# Patient Record
Sex: FEMALE | Race: WHITE | NOT HISPANIC OR LATINO | Employment: UNEMPLOYED | URBAN - METROPOLITAN AREA
[De-identification: names, ages, dates, MRNs, and addresses within clinical notes are randomized per-mention and may not be internally consistent; named-entity substitution may affect disease eponyms.]

---

## 2017-08-30 ENCOUNTER — OFFICE VISIT (OUTPATIENT)
Dept: PSYCHIATRY | Facility: CLINIC | Age: 42
End: 2017-08-30
Payer: COMMERCIAL

## 2017-08-30 VITALS
BODY MASS INDEX: 32.76 KG/M2 | SYSTOLIC BLOOD PRESSURE: 135 MMHG | HEART RATE: 76 BPM | WEIGHT: 178 LBS | DIASTOLIC BLOOD PRESSURE: 81 MMHG | HEIGHT: 62 IN

## 2017-08-30 DIAGNOSIS — F45.22 BODY DYSMORPHIC DISORDER WITH GOOD OR FAIR INSIGHT: ICD-10-CM

## 2017-08-30 DIAGNOSIS — F32.1 MODERATE SINGLE CURRENT EPISODE OF MAJOR DEPRESSIVE DISORDER: Primary | ICD-10-CM

## 2017-08-30 DIAGNOSIS — M79.7 FIBROMYALGIA: ICD-10-CM

## 2017-08-30 PROBLEM — N30.10 INTERSTITIAL CYSTITIS: Status: ACTIVE | Noted: 2017-08-30

## 2017-08-30 PROCEDURE — 99999 PR PBB SHADOW E&M-NEW PATIENT-LVL III: CPT | Mod: PBBFAC,,, | Performed by: PSYCHIATRY & NEUROLOGY

## 2017-08-30 PROCEDURE — 90792 PSYCH DIAG EVAL W/MED SRVCS: CPT | Mod: S$GLB,,, | Performed by: PSYCHIATRY & NEUROLOGY

## 2017-08-30 RX ORDER — CLONAZEPAM 0.5 MG/1
0.5 TABLET ORAL DAILY PRN
Qty: 30 TABLET | Refills: 2 | Status: SHIPPED | OUTPATIENT
Start: 2017-08-30 | End: 2017-10-24 | Stop reason: SDUPTHER

## 2017-08-30 RX ORDER — FLUOXETINE 10 MG/1
10 TABLET ORAL DAILY
Qty: 30 TABLET | Refills: 5 | Status: SHIPPED | OUTPATIENT
Start: 2017-08-30 | End: 2017-10-02 | Stop reason: SDUPTHER

## 2017-08-30 RX ORDER — TRAMADOL HYDROCHLORIDE 50 MG/1
TABLET ORAL EVERY 12 HOURS PRN
COMMUNITY
Start: 2017-08-10

## 2017-08-30 RX ORDER — MOMETASONE FUROATE 50 UG/1
SPRAY, METERED NASAL
COMMUNITY
Start: 2017-07-20

## 2017-08-30 NOTE — PROGRESS NOTES
"Ambulatory Psychiatry Clinic Initial MD Evaluation    ENCOUNTER DATE: 2017  SITE: [facility], [facility]  REFFERAL SOURCE: Emelia Landaverde MD  LENGTH OF SESSION: 60 minutes    CHIEF COMPLAINT   Anxiety      HISTORY:  HISTORY OF PRESENTING ILLNESS   Noris Washburn is a 42 y.o. female, previously in treatment with Dr Perales for anxiety     Has had anxiety for a long time, with panic attacks that were so severe that she could not leave her house. Notes that sx got better for a while, but then worsened 2 years ago. Had hysterectomy at that time. Of note, still has ovaries but complains of urinary urgency due to her bladder being punctured during surgery. Was later diagnosed with interstitial cystitis. Has also been diagnosed with fibromyalgia and arthritis in her neck,  "hurts all the time." States that bladder issues, bladder pain, fibromylagia and arthritis pain all started 2 years ago. Reports this greatly limiting her life and preventing her from accomplishing things. States that anxiety was helped by xanax that she continued until recently, using leftover supply from earlier prescriptions. Brother was in a car accident and got paralyzed in . and her mother  in . Notes that she has less extended family support and interactions since. Further stressed out by her son who had a baby with his girlfriend 3 years ago when he was 15. They have all since been living with her.  has also been drinking alcohol heavily, he is never physically violent but is disagreeable when intoxicated.     Reports sleep problems, only got 3 hours last night after taking benadryl. Will take it only occaisonally because not comfortable taking any allergy medication for sleep despite encouragement by her rheumatology. Will typically sleep only 6 hours each night, sleeps from 6 or 6:30 in the morning until 2pm. Reports waking frequently due to bladder issues. Previously had been sleeping from " midnight until 10am. Acknowledges that she has some depression but worried about the diagnosis because whenever she has been given antidepressants in the past they made her feel worse and increased her anxiety. Her family psychiatrist gave her medicine to get to this apointment. Has had thoughts that life is not worth living but would never consider suicide, cites kids and family as protective factors.    Had been treated with baclofen for muscle aches and interstitia cystitis but it made her paranoid.  Has tried paxil, zoloft and wellbutrin. Wellbutrin made her feel suicidal. Too prozac every other day with Dr Perales, states its the only one that did not make her feel worse. Doctor prescribed cymbalta but she did not want to start it because she saw her  have bad withdrawal attempting to stop it.   Low doses of amitriptyline caused weight gain and aggression/irritability. Seroquel helped with insomnia but caused restlessness. Has never tried abilify or lamictal. Gabapentin increases her anxiety. Was prescribed pregabalin but was not willing to take it because scared of the side effects. Has never tried vistaril or atarax    Has klonopin 0.5 mg, #30 with several pills remaining filled 4 months ago.     Patient at end of appointment revealed main source of distress, patient reports longstanding belief that her breath is intolerably bad and offends other people.Startedwhen she had reflux and first noted bad tastein her breath. Has steadily worsened despite GERD treatment and fundopliction. Believes that any one near her will smell her breath and not want to be around her. As a result avoids people. Even worries that it will prevent her granddaughter from wanting to be with her, became tearful discussing this. Worry about her bad breath has lowered her self esteem. Is able to see vicious cylce of avoidance and worsened self esteem and mood resulting from this fear. Patient has insight that it is probably  irrational but can't help worrying about it. Belief has gotten worse in past year. Denies other obsessive fears about her health or other subjects.   ROS   Complete review of systems performed covering Constitutional, Eyes, ENT/Mouth, Cardiovascular, Respiratory, Gastrointestinal, Genitourinary, Musculoskeletal, Skin, Neurologic, Endocrine, and Allergy/Immune. Reports chronic pain in neck and diffusely and also bladders pasms, urgency. All other systems were negative.    Psych ROS covered elsewhere in note (HPI)      PAST PSYCHIATRIC HISTORY  Denies    SUBSTANCE ABUSE HISTORY   Denies illegal substance use, tobacco use or alcohol. Denies prescription drug use.    PAST MEDICAL HISTORY   GERD, s/p fundoplication, partial thyroidectomy, arthritis, hysterectomy 2/2 pain, interstitial cystitis and fibromyalgia.    NEUROLOGIC HISTORY   Denies    MEDICATIONS   Scheduled and PRN Medications     Current Outpatient Prescriptions:     clonazepam (KLONOPIN) 1 MG tablet, Take 1 mg by mouth 3 (three) times daily as needed., Disp: , Rfl:     HYDROCODONE BIT/ACETAMINOPHEN (HYDROCODONE-ACETAMINOPHEN 7.5-325 MG/15 ML) 7.5 mg-325 mg /15 mL (15 mL) Soln, Take 15 mLs by mouth every 4 (four) hours as needed (pain)., Disp: 500 mL, Rfl: 0    lansoprazole (PREVACID SOLUTAB) 30 MG disintegrating tablet, Take 1 tablet (30 mg total) by mouth once daily., Disp: 30 tablet, Rfl: 11    ondansetron (ZOFRAN-ODT) 8 MG TbDL, Take 1 tablet (8 mg total) by mouth every 12 (twelve) hours as needed., Disp: 14 tablet, Rfl: 0    ondansetron (ZOFRAN-ODT) 8 MG TbDL, Take 1 tablet (8 mg total) by mouth every 12 (twelve) hours as needed., Disp: 14 tablet, Rfl: 0    polyethylene glycol (GLYCOLAX) 17 gram PwPk, Take 17 g by mouth as directed., Disp: 14 packet, Rfl: 0    polyethylene glycol (GLYCOLAX) 17 gram PwPk, Take 17 g by mouth as directed., Disp: 14 each, Rfl: 0        ALLERGIES   Review of patient's allergies indicates:   Allergen Reactions    Advil  "[ibuprofen] Hives         FAMILY PSYCHIATRIC HISTORY   Daughter with anxiety.     SOCIAL HISTORY  , stay at home mom, lives with , 2 sons, son's girlfriend and granddaughter.    EXAM  VITALS   Vitals:    08/30/17 1213   BP: 135/81   Pulse: 76   Weight: 80.7 kg (178 lb)   Height: 5' 2" (1.575 m)       RELEVANT LABS/STUDIES:    PSYCHIATRIC EXAMINATION  Appearance: well groomed, appearing healthy and of stated age, normaldentition, no halitosis.    Behavior: cooperative, no psychomotor agitation or retardation.  Speech: normal rate, rhythm, prosody, volume and amount  Mood: depressed, anxious  Affect: congruent  Thought Process: linear, logical, goal directed  Thought Content: negative for suicidal ideation, homicidal ideation, delusions or hallucinations. Obsessive fear about halitosis.  Associations: intact  Memory: grossly intact  Level of Consciousness/Orientation: grossly intact  Fund of Knowledge: good  Attention: good  Language: fluent, able to name abstract and concrete objects.  Insight: fair  Judgment: somewhat limited in regards to fear of halitosis, patient allowing fear to prevent her fromsocializing and going out.     Psychomotor signs: no involuntary movements or tremor  Gait: normal    Medical Decision Making    IMPRESSION   43 yo F with chronic anxiety and longstanding fear about having halitosis, now presenting with depressive sx and obsessive fear about having bad breath. Presentation consistent with CARRIE and probably body dysmorphic disorder, complicated by acute depression in setting of several stressors. Patient also with hx of various obsessions and compulsions, possibly consistent with OCD, but none ever as severe as current fear of halitosis. Has had bad response to numerous past serotonergic agents, but no evidence of bipolar disorder.      DIAGNOSES  Body dysmorphic diosrder  Major Depressive Disorder,single episode moderate  Fibromyalgia    R/o OCD, CARRIE, Panic " disorder        PLAN  1. Start prozac 5 mg daily in the morning (1/2 of a 10 mg tablet). Once you are tolerating it, increase it to 10 mg daily. Patient unable to tolerate other ssris due to side effects.  2. You may continue klonopin 0.5 mg as needed daily for anxiety. Try not to take together with tramadol. Counseled about risk of overdose if used in conjunction.Pt uses sparingly, no concern for addiction.  3. Try melatonin 3 mg to help you sleep earlier. Recommend taking 2 hours before desired bedtime and gradually trying to set bedtime earlier by 2 hours every several days (eg take at 2 am tonight and then fall asleep at 4am. Then after 5 days take at 12 midnight and try to fall asleep at 2am etc.  4. If prozac is not working or you are unable to tolerate, send me a message or call.  5. REcommend therapy.  6. Set up appointment with primary care to check bloodwork, in particular have your thyroid hormone checked (TSH).  7. Return for follow up in 1-2 months.   8. Set up [facility] account and send me message if having problems.      ABILITY TO ADHERE TO TREATMENT PLAN  Fair

## 2017-08-30 NOTE — PATIENT INSTRUCTIONS
1. Start prozac 5 mg daily in the morning (1/2 of a 10 mg tablet). Once you are tolerating it, increase it to 10 mg daily.   2. You may continue klonopin 0.5 mg as needed daily for anxiety. Try not to take together with tramadol.  3. Try melatonin 3 mg to help you sleep earlier. Recommend taking 2 hours before desired bedtime and gradually trying to set bedtime earlier by 2 hours every several days (eg take at 2 am tonight and then fall asleep at 4am. Then after 5 days take at 12 midnight and try to fall asleep at 2am etc.  4. If prozac is not working or you are unable to tolerate, send me a message or call.  5. REcommend therapy.  6. Set up appointment with primary care to check bloodwork, in particular have your thyroid hormone checked (TSH).  7. Return for follow up in 1-2 months.   8. Set up [facility] account and send me message if having problems.

## 2017-09-03 PROBLEM — F32.1 MODERATE SINGLE CURRENT EPISODE OF MAJOR DEPRESSIVE DISORDER: Status: ACTIVE | Noted: 2017-09-03

## 2017-09-22 ENCOUNTER — PATIENT MESSAGE (OUTPATIENT)
Dept: PSYCHIATRY | Facility: CLINIC | Age: 42
End: 2017-09-22

## 2017-10-02 ENCOUNTER — PATIENT MESSAGE (OUTPATIENT)
Dept: PSYCHIATRY | Facility: CLINIC | Age: 42
End: 2017-10-02

## 2017-10-02 RX ORDER — FLUOXETINE 20 MG/1
20 TABLET ORAL DAILY
Qty: 30 TABLET | Refills: 5 | Status: SHIPPED | OUTPATIENT
Start: 2017-10-02 | End: 2017-10-24

## 2017-10-24 ENCOUNTER — OFFICE VISIT (OUTPATIENT)
Dept: PSYCHIATRY | Facility: CLINIC | Age: 42
End: 2017-10-24
Payer: COMMERCIAL

## 2017-10-24 ENCOUNTER — LAB VISIT (OUTPATIENT)
Dept: LAB | Facility: HOSPITAL | Age: 42
End: 2017-10-24
Attending: PSYCHIATRY & NEUROLOGY
Payer: COMMERCIAL

## 2017-10-24 VITALS
BODY MASS INDEX: 32.54 KG/M2 | DIASTOLIC BLOOD PRESSURE: 70 MMHG | WEIGHT: 176.81 LBS | HEIGHT: 62 IN | SYSTOLIC BLOOD PRESSURE: 152 MMHG | HEART RATE: 91 BPM

## 2017-10-24 DIAGNOSIS — F32.1 MODERATE SINGLE CURRENT EPISODE OF MAJOR DEPRESSIVE DISORDER: ICD-10-CM

## 2017-10-24 DIAGNOSIS — F45.22 BODY DYSMORPHIC DISORDER WITH GOOD OR FAIR INSIGHT: Primary | ICD-10-CM

## 2017-10-24 DIAGNOSIS — M79.7 FIBROMYALGIA: ICD-10-CM

## 2017-10-24 LAB
25(OH)D3+25(OH)D2 SERPL-MCNC: 27 NG/ML
ALBUMIN SERPL BCP-MCNC: 3.5 G/DL
ALP SERPL-CCNC: 73 U/L
ALT SERPL W/O P-5'-P-CCNC: 15 U/L
ANION GAP SERPL CALC-SCNC: 8 MMOL/L
AST SERPL-CCNC: 20 U/L
BASOPHILS # BLD AUTO: 0.08 K/UL
BASOPHILS NFR BLD: 1.2 %
BILIRUB SERPL-MCNC: 0.6 MG/DL
BUN SERPL-MCNC: 10 MG/DL
CALCIUM SERPL-MCNC: 8.9 MG/DL
CHLORIDE SERPL-SCNC: 104 MMOL/L
CHOLEST SERPL-MCNC: 189 MG/DL
CHOLEST/HDLC SERPL: 4.1 {RATIO}
CO2 SERPL-SCNC: 29 MMOL/L
CREAT SERPL-MCNC: 0.8 MG/DL
DIFFERENTIAL METHOD: NORMAL
EOSINOPHIL # BLD AUTO: 0.3 K/UL
EOSINOPHIL NFR BLD: 4.1 %
ERYTHROCYTE [DISTWIDTH] IN BLOOD BY AUTOMATED COUNT: 11.8 %
EST. GFR  (AFRICAN AMERICAN): >60 ML/MIN/1.73 M^2
EST. GFR  (NON AFRICAN AMERICAN): >60 ML/MIN/1.73 M^2
GLUCOSE SERPL-MCNC: 88 MG/DL
HCT VFR BLD AUTO: 39.9 %
HDLC SERPL-MCNC: 46 MG/DL
HDLC SERPL: 24.3 %
HGB BLD-MCNC: 13.3 G/DL
IMM GRANULOCYTES # BLD AUTO: 0.02 K/UL
IMM GRANULOCYTES NFR BLD AUTO: 0.3 %
LDLC SERPL CALC-MCNC: 111 MG/DL
LYMPHOCYTES # BLD AUTO: 1.5 K/UL
LYMPHOCYTES NFR BLD: 22.2 %
MCH RBC QN AUTO: 30.7 PG
MCHC RBC AUTO-ENTMCNC: 33.3 G/DL
MCV RBC AUTO: 92 FL
MONOCYTES # BLD AUTO: 0.5 K/UL
MONOCYTES NFR BLD: 8 %
NEUTROPHILS # BLD AUTO: 4.2 K/UL
NEUTROPHILS NFR BLD: 64.2 %
NONHDLC SERPL-MCNC: 143 MG/DL
NRBC BLD-RTO: 0 /100 WBC
PLATELET # BLD AUTO: 274 K/UL
PMV BLD AUTO: 10.2 FL
POTASSIUM SERPL-SCNC: 3.4 MMOL/L
PROT SERPL-MCNC: 6.9 G/DL
RBC # BLD AUTO: 4.33 M/UL
SODIUM SERPL-SCNC: 141 MMOL/L
TRIGL SERPL-MCNC: 160 MG/DL
TSH SERPL DL<=0.005 MIU/L-ACNC: 2.32 UIU/ML
WBC # BLD AUTO: 6.52 K/UL

## 2017-10-24 PROCEDURE — 85025 COMPLETE CBC W/AUTO DIFF WBC: CPT

## 2017-10-24 PROCEDURE — 80061 LIPID PANEL: CPT

## 2017-10-24 PROCEDURE — 80053 COMPREHEN METABOLIC PANEL: CPT

## 2017-10-24 PROCEDURE — 84443 ASSAY THYROID STIM HORMONE: CPT

## 2017-10-24 PROCEDURE — 99999 PR PBB SHADOW E&M-EST. PATIENT-LVL III: CPT | Mod: PBBFAC,,, | Performed by: PSYCHIATRY & NEUROLOGY

## 2017-10-24 PROCEDURE — 82306 VITAMIN D 25 HYDROXY: CPT

## 2017-10-24 PROCEDURE — 99214 OFFICE O/P EST MOD 30 MIN: CPT | Mod: S$GLB,,, | Performed by: PSYCHIATRY & NEUROLOGY

## 2017-10-24 PROCEDURE — 36415 COLL VENOUS BLD VENIPUNCTURE: CPT

## 2017-10-24 RX ORDER — FLUOXETINE 20 MG/1
40 TABLET ORAL DAILY
Qty: 60 TABLET | Refills: 5 | Status: SHIPPED | OUTPATIENT
Start: 2017-10-24 | End: 2018-10-24

## 2017-10-24 RX ORDER — CLONAZEPAM 0.5 MG/1
0.5 TABLET ORAL DAILY PRN
Qty: 30 TABLET | Refills: 2 | Status: SHIPPED | OUTPATIENT
Start: 2017-10-24 | End: 2018-05-18 | Stop reason: SDUPTHER

## 2017-10-24 NOTE — PATIENT INSTRUCTIONS
1. Increase prozac to 40 mg daily in 1-2 weeks.  2. Continue klonopin 0.5 mg as needed daily for anxiety  3. Get bloodwork, ask  in front to set up bloodwork.  4. Set up appointment in 3 months.

## 2017-10-24 NOTE — PROGRESS NOTES
Ambulatory Psychiatry Established Patient Follow-up Note      Chief Complaint  presents for followup of depression, anxiety, OCD sx    Time Spent  30 minutes    HISTORY  Interval History  Helped a little by the medication, feeling gradual improvement. Is having some gastrointestinal symptom but has had these for a logn time. With more diarrhea. Has IBS diagnosis. No other side effects. Has gradually increased over time, up to 30 mg as of 4 days ago. Has been getting out of her room more. Still fibromyalgia. Still feel worried about halitosis and it pushing people away.     ROS   Constitutional: no fatigue or appetite or weight change  Eyes: no problems with vision  ENT/Mouth: no problems with hearing, swallowing  Cardiovascular: no chest pain  Respiratory: no shortness of breath  Gastrointestinal: chronic diarrhea. No abdominal pain or nausea/vomiting  Genitourinary: no urinary difficulties  Musculoskeletal: fibromylalgia pain  Skin: no rashes  Neurologic: no numbness or weakness   Endocrine: no sweating or hot flashes.   All other systems were negative.    Psych ROS covered in hospitals  Past Medical History was reviewed and there was no change in past medical history  Family History was not reviewed  Social History was reviewed, changes in social history noted in interval history above.  Medications/problem list/allergies were reviewed and updated in the patient summary.    Medications    Scheduled and PRN Medications     Current Outpatient Prescriptions:     clonazePAM (KLONOPIN) 0.5 MG tablet, Take 1 tablet (0.5 mg total) by mouth daily as needed for Anxiety., Disp: 30 tablet, Rfl: 2    clonazepam (KLONOPIN) 1 MG tablet, Take 0.5 mg by mouth daily as needed. , Disp: , Rfl:     fluoxetine 20 MG tablet, Take 1 tablet (20 mg total) by mouth once daily., Disp: 30 tablet, Rfl: 5    mometasone (NASONEX) 50 mcg/actuation nasal spray, , Disp: , Rfl:     tramadol (ULTRAM) 50 mg tablet, every 12 (twelve) hours as  "needed. , Disp: , Rfl:     Allergies  Review of patient's allergies indicates:   Allergen Reactions    Advil [ibuprofen] Hives    Toradol [ketorolac] Hives       EXAM  VITALS   Vitals:    10/24/17 0752   BP: (!) 152/70   Pulse: 91   Weight: 80.2 kg (176 lb 12.8 oz)   Height: 5' 2" (1.575 m)       RELEVANT LABS/STUDIES:      PSYCHIATRIC EXAMINATION  Appearance: well groomed, appearing healthy and of stated age, normal dentition, no halitosis.    Behavior: cooperative, no psychomotor agitation or retardation.  Speech: normal rate, rhythm, prosody, volume and amount  Mood: depressed, but better  Affect: a little brighter, anxious  Thought Process: linear, logical, goal directed  Thought Content: negative for suicidal ideation, homicidal ideation, delusions or hallucinations. Obsessive fear about halitosis.  Associations: intact  Memory: grossly intact  Level of Consciousness/Orientation: grossly intact  Fund of Knowledge: good  Attention: good  Language: fluent, able to name abstract and concrete objects.  Insight: fair  Judgment: somewhat limited in regards to fear of halitosis, patient allowing fear to prevent her fromsocializing and going out.     Psychomotor signs: no involuntary movements or tremor  Gait: normal    Medical Decision Making    IMPRESSION   41 yo F with chronic anxiety and longstanding fear about having halitosis, now presenting with depressive sx and obsessive fear about having bad breath. Presentation consistent with CARRIE and probably body dysmorphic disorder, complicated by acute depression in setting of several stressors. Patient also with hx of various obsessions and compulsions, possibly consistent with OCD, but none ever as severe as current fear of halitosis. Has had bad response to numerous past serotonergic agents, but no evidence of bipolar disorder. Started patient on very low dose of prozac, which we have proceeded to slowly increase, pt returns today now on 30 mg of prozac for the past " several days, noting some improvement in anxiety, ruminations and depression.       DIAGNOSES  Body dysmorphic diosrder  Major Depressive Disorder,single episode moderate  Fibromyalgia    R/o OCD, CARRIE, Panic disorder        PLAN  1. Continue prozac 30 mg daily for the next 1-2 weeks, then increase prozac to 40 mg daily.  Patient unable to tolerate other ssris due to side effects, however titration may have occurred too quickly and from too high of an initial dose during previous trials. .  2. You may continue klonopin 0.5 mg as needed daily for anxiety. Try not to take together with tramadol. Counseled about risk of overdose if used in conjunction.Pt uses sparingly, no concern for addiction. Reviewed LA , no evidence of misuse.  3. Recommended therapy.  4. Ordered bloodwork including TSH, comprehensive metabolic panel, cbc, fasting lipids.  5. Return for follow up in 3 months.     More than 50% of the time was spent on counseling and coordination of care.  Psychoeducation, behavioral counseling on management of obsessions.

## 2018-02-05 ENCOUNTER — PATIENT MESSAGE (OUTPATIENT)
Dept: PAIN MEDICINE | Facility: CLINIC | Age: 43
End: 2018-02-05

## 2018-02-05 ENCOUNTER — TELEPHONE (OUTPATIENT)
Dept: PAIN MEDICINE | Facility: CLINIC | Age: 43
End: 2018-02-05

## 2018-02-05 NOTE — TELEPHONE ENCOUNTER
Contacted and spoke with patient regarding IPM. Patient was informed on the treatment options our physician provides. Patient asked me to cancel her appointment and she will find someone closer to home.

## 2018-03-06 ENCOUNTER — OFFICE VISIT (OUTPATIENT)
Dept: PSYCHIATRY | Facility: CLINIC | Age: 43
End: 2018-03-06
Payer: COMMERCIAL

## 2018-03-06 VITALS
BODY MASS INDEX: 33.34 KG/M2 | HEIGHT: 62 IN | SYSTOLIC BLOOD PRESSURE: 150 MMHG | WEIGHT: 181.19 LBS | DIASTOLIC BLOOD PRESSURE: 91 MMHG | HEART RATE: 93 BPM

## 2018-03-06 DIAGNOSIS — F45.22 BODY DYSMORPHIC DISORDER WITH GOOD OR FAIR INSIGHT: ICD-10-CM

## 2018-03-06 DIAGNOSIS — F32.1 MODERATE SINGLE CURRENT EPISODE OF MAJOR DEPRESSIVE DISORDER: Primary | ICD-10-CM

## 2018-03-06 DIAGNOSIS — M79.7 FIBROMYALGIA: ICD-10-CM

## 2018-03-06 PROCEDURE — 99999 PR PBB SHADOW E&M-EST. PATIENT-LVL II: CPT | Mod: PBBFAC,,, | Performed by: PSYCHIATRY & NEUROLOGY

## 2018-03-06 PROCEDURE — 99215 OFFICE O/P EST HI 40 MIN: CPT | Mod: S$GLB,,, | Performed by: PSYCHIATRY & NEUROLOGY

## 2018-03-06 RX ORDER — TIZANIDINE HYDROCHLORIDE 4 MG/1
4 CAPSULE, GELATIN COATED ORAL NIGHTLY
COMMUNITY
Start: 2018-02-08

## 2018-03-06 NOTE — PROGRESS NOTES
Ambulatory Psychiatry Established Patient Follow-up Note      Chief Complaint  presents for followup of depression, anxiety, OCD sx    Time Spent  37 minutes    HISTORY  Interval History  Had a rough few months, missed appointment due to having to go to ER. STarted on new medication which made hher feel anxious. As a result of worrying about the medication combination with, she stopped the prozac. Then went to 40 mg which made her feel too anxious, so she went back to 10 mg and gradually titrated back up. Got depressed due to stopping the prozac. Also having financial  Stressors, paying for son's wedding. Stress manifests in muscle tension, for which     ROS   Constitutional: no fatigue or appetite or weight change  Eyes: no problems with vision  ENT/Mouth: no problems with hearing, swallowing  Cardiovascular: no chest pain  Respiratory: no shortness of breath  Gastrointestinal: chronic diarrhea. No abdominal pain or nausea/vomiting  Genitourinary: no urinary difficulties  Musculoskeletal: fibromylalgia pain  Skin: no rashes  Neurologic: no numbness or weakness   Endocrine: no sweating or hot flashes.   All other systems were negative.    Psych ROS covered in [facility]  Past Medical History was reviewed and there was no change in past medical history  Family History was not reviewed  Social History was reviewed, changes in social history noted in interval history above.  Medications/problem list/allergies were reviewed and updated in the patient summary.    Medications    Scheduled and PRN Medications     Current Outpatient Prescriptions:     clonazePAM (KLONOPIN) 0.5 MG tablet, Take 1 tablet (0.5 mg total) by mouth daily as needed for Anxiety., Disp: 30 tablet, Rfl: 2    FLUoxetine 20 MG tablet, Take 2 tablets (40 mg total) by mouth once daily., Disp: 60 tablet, Rfl: 5    mometasone (NASONEX) 50 mcg/actuation nasal spray, , Disp: , Rfl:     tramadol (ULTRAM) 50 mg tablet, every 12 (twelve) hours as needed. ,  "Disp: , Rfl:     Allergies  Review of patient's allergies indicates:   Allergen Reactions    Advil [ibuprofen] Hives    Toradol [ketorolac] Hives       EXAM  VITALS   Vitals:    03/06/18 0753   BP: (!) 150/91   Pulse: 93   Weight: 82.2 kg (181 lb 3.5 oz)   Height: 5' 2" (1.575 m)       RELEVANT LABS/STUDIES:      PSYCHIATRIC EXAMINATION  Appearance: well groomed, appearing healthy and of stated age, normal dentition, no halitosis.    Behavior: cooperative, no psychomotor agitation or retardation.  Speech: normal rate, rhythm, prosody, volume and amount  Mood: sad overwhelmed  Affect: a little brighter, anxious  Thought Process: linear, logical, goal directed  Thought Content: negative for suicidal ideation, homicidal ideation, delusions or hallucinations. Obsessive fear about halitosis.  Associations: intact  Memory: grossly intact  Level of Consciousness/Orientation: grossly intact  Fund of Knowledge: good  Attention: good  Language: fluent, able to name abstract and concrete objects.  Insight: fair  Judgment: somewhat limited in regards to fear of halitosis, patient allowing fear to prevent her fromsocializing and going out.     Psychomotor signs: no involuntary movements or tremor  Gait: normal    Medical Decision Making    IMPRESSION   41 yo F with chronic anxiety and longstanding fear about having halitosis, now presenting with depressive sx and obsessive fear about having bad breath. Presentation consistent with CARRIE and probably body dysmorphic disorder, complicated by acute depression in setting of several stressors. Patient also with hx of various obsessions and compulsions, possibly consistent with OCD, but none ever as severe as current fear of halitosis. Has had bad response to numerous past serotonergic agents, but no evidence of bipolar disorder. Started patient on very low dose of prozac, and titrated upt slowly, patient had significant improvement with 40 mg daily. However she stopped it recently in " setting of worry about taking it in combiantion with new muscle relaxer. Felt more depressed and anxious after stopping, has gradually resumed medication, now back up to 40 mg for past few days, beginning to notice improvement again.    DIAGNOSES  Body dysmorphic diosrder  Major Depressive Disorder,single episode moderate  Fibromyalgia    R/o OCD, CARRIE, Panic disorder        PLAN  1. Continue prozac 40 mg daily.  Patient unable to tolerate other ssris due to side effects, however titration may have occurred too quickly and from too high of an initial dose during previous trials. Counseled patient not to stop medication without reaching out for advice first.  2. You may continue klonopin 0.5 mg as needed daily for anxiety. Try not to take together with tramadol. Counseled about risk of overdose if used in conjunction.Pt uses sparingly, no concern for addiction. Reviewed LA , no evidence of misuse.  3. Recommended therapy, provided with contact information for [facility]. .  4. Bloodwork from last visit reviewed, only mildly low vitamin D and mildly elevated triglycerides, follow up with PCP.  5. Return for follow up in 3 months.     More than 50% of the time was spent on counseling and coordination of care.  Psychoeducation, behavioral counseling on management of obsessions.

## 2018-04-16 ENCOUNTER — PATIENT MESSAGE (OUTPATIENT)
Dept: PSYCHIATRY | Facility: CLINIC | Age: 43
End: 2018-04-16

## 2018-04-18 ENCOUNTER — PATIENT MESSAGE (OUTPATIENT)
Dept: PSYCHIATRY | Facility: CLINIC | Age: 43
End: 2018-04-18

## 2018-05-18 DIAGNOSIS — F32.1 MODERATE SINGLE CURRENT EPISODE OF MAJOR DEPRESSIVE DISORDER: ICD-10-CM

## 2018-05-18 DIAGNOSIS — F45.22 BODY DYSMORPHIC DISORDER WITH GOOD OR FAIR INSIGHT: ICD-10-CM

## 2018-05-21 RX ORDER — CLONAZEPAM 0.5 MG/1
TABLET ORAL
Qty: 30 TABLET | Refills: 2 | Status: SHIPPED | OUTPATIENT
Start: 2018-05-21

## 2018-06-15 ENCOUNTER — OFFICE VISIT (OUTPATIENT)
Dept: PSYCHIATRY | Facility: CLINIC | Age: 43
End: 2018-06-15
Payer: COMMERCIAL

## 2018-06-15 VITALS
HEART RATE: 99 BPM | WEIGHT: 183 LBS | SYSTOLIC BLOOD PRESSURE: 150 MMHG | HEIGHT: 62 IN | DIASTOLIC BLOOD PRESSURE: 68 MMHG | BODY MASS INDEX: 33.68 KG/M2

## 2018-06-15 DIAGNOSIS — M79.7 FIBROMYALGIA: ICD-10-CM

## 2018-06-15 DIAGNOSIS — F45.22 BODY DYSMORPHIC DISORDER WITH GOOD OR FAIR INSIGHT: ICD-10-CM

## 2018-06-15 DIAGNOSIS — F32.1 MODERATE SINGLE CURRENT EPISODE OF MAJOR DEPRESSIVE DISORDER: Primary | ICD-10-CM

## 2018-06-15 PROCEDURE — 90833 PSYTX W PT W E/M 30 MIN: CPT | Mod: S$GLB,,, | Performed by: PSYCHIATRY & NEUROLOGY

## 2018-06-15 PROCEDURE — 3008F BODY MASS INDEX DOCD: CPT | Mod: CPTII,S$GLB,, | Performed by: PSYCHIATRY & NEUROLOGY

## 2018-06-15 PROCEDURE — 99213 OFFICE O/P EST LOW 20 MIN: CPT | Mod: S$GLB,,, | Performed by: PSYCHIATRY & NEUROLOGY

## 2018-06-15 PROCEDURE — 99999 PR PBB SHADOW E&M-EST. PATIENT-LVL III: CPT | Mod: PBBFAC,,, | Performed by: PSYCHIATRY & NEUROLOGY

## 2018-06-15 RX ORDER — DULOXETIN HYDROCHLORIDE 20 MG/1
CAPSULE, DELAYED RELEASE ORAL
Qty: 60 CAPSULE | Refills: 11 | Status: SHIPPED | OUTPATIENT
Start: 2018-06-15

## 2018-06-15 NOTE — PROGRESS NOTES
"Ambulatory Psychiatry Established Patient Follow-up Note      Chief Complaint  presents for followup of depression, anxiety, OCD sx, pain    Time Spent  35 minutes    HISTORY  Interval History  Pretty good. Has gher "moments". Took herself off her medications briefly. [place] very stressed at son's wedding. Had severe pain following wedding, received shot which helped and felt better long term (dilaudid, anti nausea and something else). STarted seeing a new family doctor outside of [facility]. Counsled on cognitive ways of handling pain and behavioral methods for dealing with pain and anxiety (meditation, distraction, reframing.)    ROS   Constitutional: no fatigue or appetite or weight change  Eyes: no problems with vision  ENT/Mouth: no problems with hearing, swallowing  Cardiovascular: no chest pain  Respiratory: no shortness of breath  Gastrointestinal: chronic diarrhea. No abdominal pain or nausea/vomiting  Genitourinary: no urinary difficulties  Musculoskeletal: fibromylalgia pain  Skin: no rashes  Neurologic: no numbness or weakness   Endocrine: no sweating or hot flashes.   All other systems were negative.    Psych ROS covered in [facility]  Past Medical History was reviewed and there was no change in past medical history  Family History was not reviewed  Social History was reviewed, changes in social history noted in interval history above.  Medications/problem list/allergies were reviewed and updated in the patient summary.    Medications    Scheduled and PRN Medications     Current Outpatient Prescriptions:     clonazePAM (KLONOPIN) 0.5 MG tablet, take 1 tablet by mouth once daily if needed for anxiety, Disp: 30 tablet, Rfl: 2    FLUoxetine 20 MG tablet, Take 2 tablets (40 mg total) by mouth once daily., Disp: 60 tablet, Rfl: 5    mometasone (NASONEX) 50 mcg/actuation nasal spray, , Disp: , Rfl:     tiZANidine 4 mg Cap, Take 4 mg by mouth every evening., Disp: , Rfl:     tramadol (ULTRAM) 50 mg tablet, " "every 12 (twelve) hours as needed. , Disp: , Rfl:     Allergies  Review of patient's allergies indicates:   Allergen Reactions    Advil [ibuprofen] Hives    Toradol [ketorolac] Hives       EXAM  VITALS   Vitals:    06/15/18 0827   BP: (!) 150/68   Pulse: 99   Weight: 83 kg (182 lb 15.7 oz)   Height: 5' 2" (1.575 m)   RELEVANT LABS/STUDIES:      PSYCHIATRIC EXAMINATION  Appearance: well groomed, appearing healthy and of stated age, normal dentition, no halitosis.    Behavior: cooperative, no psychomotor agitation or retardation.  Speech: normal rate, rhythm, prosody, volume and amount  Mood: sad, anxious  Affect:anxious  Thought Process: linear, logical, goal directed  Thought Content: negative for suicidal ideation, homicidal ideation, delusions or hallucinations. Obsessive fear about halitosis.  Associations: intact  Memory: grossly intact  Level of Consciousness/Orientation: grossly intact  Fund of Knowledge: good  Attention: good  Language: fluent, able to name abstract and concrete objects.  Insight: fair  Judgment: somewhat limited in regards to fear of halitosis, patient allowing fear to prevent her fromsocializing and going out.     Psychomotor signs: no involuntary movements or tremor  Gait: normal    Medical Decision Making    IMPRESSION   43 yo F with chronic anxiety and longstanding fear about having halitosis, now presenting with depressive sx and obsessive fear about having bad breath. Presentation consistent with CARRIE and probably body dysmorphic disorder, complicated by acute depression in setting of several stressors. Patient also with hx of various obsessions and compulsions, possibly consistent with OCD, but none ever as severe as current fear of halitosis. Has had bad response to numerous past serotonergic agents, but no evidence of bipolar disorder. Started patient on very low dose of prozac, and titrated upt slowly, patient had significant improvement with 40 mg daily. However she stopped it " recently in setting of worry about taking it in combiantion with new muscle relaxer. Felt more depressed and anxious after stopping, gradually resumed medication, back up to 40 mg, beginning to notice improvement again. However patient again stopped medication due to anxiety, now returns anxious, obsessive with pain and mild depressive sx, only on prozac 10 mg.    DIAGNOSES  Body dysmorphic diosrder  Major Depressive Disorder,single episode moderate  Fibromyalgia    R/o OCD, CARRIE, Panic disorder        PLAN  1. Start cymbalta for anxiety, depression and fibromylagia pain. PCP also encouraged her to try this. Start at 20 mg daily, once tolerating increase to 40 mg daily.  2. Continue prozac 10 mg daily for now as it is low dose. Will plan to stop if patient is tolerating cymbalta.  Patient unable to tolerate other ssris due to side effects, however titration may have occurred too quickly and from too high of an initial dose during previous trials. Counseled patient not to stop medication without reaching out for advice first.  3 You may continue klonopin 0.5 mg as needed daily for anxiety. Try not to take together with tramadol. Counseled about risk of overdose if used in conjunction.Pt uses sparingly, no concern for addiction. Reviewed [place] , no evidence of misuse.  4. Recommended therapy, provided with contact information for [facility] clinic. Recommende [facility] and [facility] [place] [facility].   5. Return for follow up in 3 months.     PSYCHOTHERAPY ADD-ON [phone]   30 (16-37*) minutes    Site: [facility], [address]  Time: 20 minutes  Participants: Met with patient    Therapeutic Intervention Type: behavior modifying psychotherapy, supportive psychotherapy  Why chosen therapy is appropriate versus another modality: relevant to diagnosis    Target symptoms: depression, anxiety , pain, obsessive sx  Primary focus: anxiety relief and pain  Psychotherapeutic techniques: behavioral counseling,  psychoeducation, cognitive therapy    Outcome monitoring methods: self-report, observation    Patient's response to intervention:  The patient's response to intervention is accepting.    Progress toward goals:  The patient's progress toward goals is fair .

## 2018-06-15 NOTE — PATIENT INSTRUCTIONS
1. Continue prozac 10 mg daily.   2. Start cymbalta 20 mg daily, Once tolerating increase to 40 mg daily ( 2 capsules).  3. You may continue klonopin 0.5 mg daily as needed for anxiety.  4. Try to avoid tramadol.  5. Consider [facility] (intensive therapy program), call [phone], option 2.   6. Return in 2-3 months for follow up.    Dr Ramez Lee saw pt yesterday- worried about alcohol use and possible detox. And worried about anxiety, much more than normal. Pt has appt this afternoon.  Will follow up and refer as needed

## 2025-02-11 ENCOUNTER — TELEPHONE (OUTPATIENT)
Dept: PSYCHIATRY | Facility: CLINIC | Age: 50
End: 2025-02-11
Payer: COMMERCIAL

## 2025-02-11 NOTE — TELEPHONE ENCOUNTER
----- Message from Jessie sent at 2/10/2025  2:11 PM CST -----  Contact: PATIENT  Noris Washburn  MRN: [id number]  : 1975  PCP: Emelia Landaverde  Home Phone      [phone]  Work Phone      Not on file.  Mobile          [phone]  Home Phone      [phone]  Mobile          [phone]      MESSAGE: Patient would like to make an appointment for the treatment of anxiety & depression.          Phone: [phone]

## 2025-02-12 NOTE — TELEPHONE ENCOUNTER
Attempted to contact patient and schedule. No answer, left a vm to return call to clinic for scheduling.

## 2025-04-15 ENCOUNTER — OFFICE VISIT (OUTPATIENT)
Dept: PRIMARY CARE CLINIC | Facility: CLINIC | Age: 50
End: 2025-04-15
Payer: COMMERCIAL

## 2025-04-15 VITALS
DIASTOLIC BLOOD PRESSURE: 109 MMHG | OXYGEN SATURATION: 97 % | WEIGHT: 173.19 LBS | BODY MASS INDEX: 31.87 KG/M2 | HEART RATE: 98 BPM | RESPIRATION RATE: 18 BRPM | HEIGHT: 62 IN | TEMPERATURE: 98 F | SYSTOLIC BLOOD PRESSURE: 184 MMHG

## 2025-04-15 DIAGNOSIS — Z11.4 ENCOUNTER FOR SCREENING FOR HIV: ICD-10-CM

## 2025-04-15 DIAGNOSIS — F41.9 ANXIETY: ICD-10-CM

## 2025-04-15 DIAGNOSIS — Z13.29 THYROID DISORDER SCREENING: ICD-10-CM

## 2025-04-15 DIAGNOSIS — Z13.0 SCREENING FOR IRON DEFICIENCY ANEMIA: ICD-10-CM

## 2025-04-15 DIAGNOSIS — R23.2 HOT FLASHES: ICD-10-CM

## 2025-04-15 DIAGNOSIS — Z76.89 ENCOUNTER TO ESTABLISH CARE: Primary | ICD-10-CM

## 2025-04-15 DIAGNOSIS — Z13.21 ENCOUNTER FOR VITAMIN DEFICIENCY SCREENING: ICD-10-CM

## 2025-04-15 DIAGNOSIS — F32.1 MODERATE SINGLE CURRENT EPISODE OF MAJOR DEPRESSIVE DISORDER: ICD-10-CM

## 2025-04-15 DIAGNOSIS — Z13.220 NEED FOR LIPID SCREENING: ICD-10-CM

## 2025-04-15 DIAGNOSIS — Z12.31 ENCOUNTER FOR SCREENING MAMMOGRAM FOR MALIGNANT NEOPLASM OF BREAST: ICD-10-CM

## 2025-04-15 DIAGNOSIS — Z13.1 SCREENING FOR DIABETES MELLITUS (DM): ICD-10-CM

## 2025-04-15 DIAGNOSIS — R61 NIGHT SWEATS: ICD-10-CM

## 2025-04-15 DIAGNOSIS — Z11.59 ENCOUNTER FOR HEPATITIS C SCREENING TEST FOR LOW RISK PATIENT: ICD-10-CM

## 2025-04-15 LAB
25(OH)D3+25(OH)D2 SERPL-MCNC: 33 NG/ML (ref 30–96)
ABSOLUTE EOSINOPHIL (OHS): 0.14 K/UL
ABSOLUTE MONOCYTE (OHS): 0.32 K/UL (ref 0.3–1)
ABSOLUTE NEUTROPHIL COUNT (OHS): 4.18 K/UL (ref 1.8–7.7)
ALBUMIN SERPL BCP-MCNC: 4.3 G/DL (ref 3.5–5.2)
ALBUMIN/CREAT UR: 36.8 UG/MG
ALP SERPL-CCNC: 72 UNIT/L (ref 40–150)
ALT SERPL W/O P-5'-P-CCNC: 9 UNIT/L (ref 10–44)
ANION GAP (OHS): 10 MMOL/L (ref 8–16)
AST SERPL-CCNC: 21 UNIT/L (ref 11–45)
BASOPHILS # BLD AUTO: 0.08 K/UL
BASOPHILS NFR BLD AUTO: 1.2 %
BILIRUB SERPL-MCNC: 0.6 MG/DL (ref 0.1–1)
BUN SERPL-MCNC: 9 MG/DL (ref 6–20)
CALCIUM SERPL-MCNC: 9.1 MG/DL (ref 8.7–10.5)
CHLORIDE SERPL-SCNC: 103 MMOL/L (ref 95–110)
CHOLEST SERPL-MCNC: 217 MG/DL (ref 120–199)
CHOLEST/HDLC SERPL: 3.2 {RATIO} (ref 2–5)
CO2 SERPL-SCNC: 26 MMOL/L (ref 23–29)
CREAT SERPL-MCNC: 0.7 MG/DL (ref 0.5–1.4)
CREAT UR-MCNC: 152.1 MG/DL (ref 15–325)
EAG (OHS): 91 MG/DL (ref 68–131)
ERYTHROCYTE [DISTWIDTH] IN BLOOD BY AUTOMATED COUNT: 12 % (ref 11.5–14.5)
GFR SERPLBLD CREATININE-BSD FMLA CKD-EPI: >60 ML/MIN/1.73/M2
GLUCOSE SERPL-MCNC: 94 MG/DL (ref 70–110)
HBA1C MFR BLD: 4.8 % (ref 4–5.6)
HCT VFR BLD AUTO: 41.3 % (ref 37–48.5)
HCV AB SERPL QL IA: NORMAL
HDLC SERPL-MCNC: 67 MG/DL (ref 40–75)
HDLC SERPL: 30.9 % (ref 20–50)
HGB BLD-MCNC: 14.1 GM/DL (ref 12–16)
IMM GRANULOCYTES # BLD AUTO: 0.01 K/UL (ref 0–0.04)
IMM GRANULOCYTES NFR BLD AUTO: 0.2 % (ref 0–0.5)
LDLC SERPL CALC-MCNC: 116.4 MG/DL (ref 63–159)
LYMPHOCYTES # BLD AUTO: 1.84 K/UL (ref 1–4.8)
MCH RBC QN AUTO: 32.3 PG (ref 27–31)
MCHC RBC AUTO-ENTMCNC: 34.1 G/DL (ref 32–36)
MCV RBC AUTO: 95 FL (ref 82–98)
MICROALBUMIN UR-MCNC: 56 UG/ML (ref ?–5000)
NONHDLC SERPL-MCNC: 150 MG/DL
NUCLEATED RBC (/100WBC) (OHS): 0 /100 WBC
PLATELET # BLD AUTO: 248 K/UL (ref 150–450)
PMV BLD AUTO: 10.3 FL (ref 9.2–12.9)
POTASSIUM SERPL-SCNC: 2.9 MMOL/L (ref 3.5–5.1)
PROT SERPL-MCNC: 7.2 GM/DL (ref 6–8.4)
RBC # BLD AUTO: 4.36 M/UL (ref 4–5.4)
RELATIVE EOSINOPHIL (OHS): 2.1 %
RELATIVE LYMPHOCYTE (OHS): 28 % (ref 18–48)
RELATIVE MONOCYTE (OHS): 4.9 % (ref 4–15)
RELATIVE NEUTROPHIL (OHS): 63.6 % (ref 38–73)
SODIUM SERPL-SCNC: 139 MMOL/L (ref 136–145)
TRIGL SERPL-MCNC: 168 MG/DL (ref 30–150)
TSH SERPL-ACNC: 1.26 UIU/ML (ref 0.4–4)
WBC # BLD AUTO: 6.57 K/UL (ref 3.9–12.7)

## 2025-04-15 PROCEDURE — 80061 LIPID PANEL: CPT | Performed by: NURSE PRACTITIONER

## 2025-04-15 PROCEDURE — 36415 COLL VENOUS BLD VENIPUNCTURE: CPT | Performed by: NURSE PRACTITIONER

## 2025-04-15 PROCEDURE — 99999 PR PBB SHADOW E&M-EST. PATIENT-LVL V: CPT | Mod: PBBFAC,,, | Performed by: NURSE PRACTITIONER

## 2025-04-15 PROCEDURE — 87389 HIV-1 AG W/HIV-1&-2 AB AG IA: CPT | Performed by: NURSE PRACTITIONER

## 2025-04-15 PROCEDURE — 84443 ASSAY THYROID STIM HORMONE: CPT | Performed by: NURSE PRACTITIONER

## 2025-04-15 PROCEDURE — 1160F RVW MEDS BY RX/DR IN RCRD: CPT | Mod: CPTII,S$GLB,, | Performed by: NURSE PRACTITIONER

## 2025-04-15 PROCEDURE — 3080F DIAST BP >= 90 MM HG: CPT | Mod: CPTII,S$GLB,, | Performed by: NURSE PRACTITIONER

## 2025-04-15 PROCEDURE — 85025 COMPLETE CBC W/AUTO DIFF WBC: CPT | Performed by: NURSE PRACTITIONER

## 2025-04-15 PROCEDURE — 3077F SYST BP >= 140 MM HG: CPT | Mod: CPTII,S$GLB,, | Performed by: NURSE PRACTITIONER

## 2025-04-15 PROCEDURE — 99204 OFFICE O/P NEW MOD 45 MIN: CPT | Mod: S$GLB,,, | Performed by: NURSE PRACTITIONER

## 2025-04-15 PROCEDURE — 80053 COMPREHEN METABOLIC PANEL: CPT | Performed by: NURSE PRACTITIONER

## 2025-04-15 PROCEDURE — 36415 COLL VENOUS BLD VENIPUNCTURE: CPT | Mod: S$GLB,,, | Performed by: NURSE PRACTITIONER

## 2025-04-15 PROCEDURE — 1159F MED LIST DOCD IN RCRD: CPT | Mod: CPTII,S$GLB,, | Performed by: NURSE PRACTITIONER

## 2025-04-15 PROCEDURE — 83036 HEMOGLOBIN GLYCOSYLATED A1C: CPT | Performed by: NURSE PRACTITIONER

## 2025-04-15 PROCEDURE — 82306 VITAMIN D 25 HYDROXY: CPT | Performed by: NURSE PRACTITIONER

## 2025-04-15 PROCEDURE — 3008F BODY MASS INDEX DOCD: CPT | Mod: CPTII,S$GLB,, | Performed by: NURSE PRACTITIONER

## 2025-04-15 PROCEDURE — 82043 UR ALBUMIN QUANTITATIVE: CPT | Performed by: NURSE PRACTITIONER

## 2025-04-15 PROCEDURE — 86803 HEPATITIS C AB TEST: CPT | Performed by: NURSE PRACTITIONER

## 2025-04-15 RX ORDER — ESTRADIOL 0.1 MG/D
1 FILM, EXTENDED RELEASE TRANSDERMAL
Qty: 8 PATCH | Refills: 11 | Status: SHIPPED | OUTPATIENT
Start: 2025-04-17 | End: 2026-04-17

## 2025-04-15 RX ORDER — ALPRAZOLAM 0.5 MG/1
0.5 TABLET ORAL 2 TIMES DAILY PRN
Qty: 60 TABLET | Refills: 5 | Status: SHIPPED | OUTPATIENT
Start: 2025-04-15 | End: 2025-10-12

## 2025-04-15 NOTE — PROGRESS NOTES
[facility] Note    HPI:  Noris Washburn is a 49 y.o. female who presents today for Establish Care (Pt here to establish care/ menopause issues)    Patient crying during visit, very depressed, denies suicidal ideation  No help- Paxil, Zoloft, ,Wellbutrin (SUICIDE THOUGHTS)    Had PAH in 2014, and experienced pain afterwards, possibly due to bladder being nicked or sewn to stomach during procedure    Had ovaries removed in 2020? in [place]    ROS   A review of systems was performed and was negative except as noted above.    I personally reviewed allergies, past medical, surgical, social and family history and updated as appropriate.    Medications:  Current Medications[1]     Health Maintenance:  Immunization History   Administered Date(s) Administered    Influenza - Trivalent - Afluria, Fluzone MDV 10/29/2003    Td (ADULT) 01/01/1996      Health Maintenance   Topic Date Due    Hepatitis C Screening  Never done    HIV Screening  Never done    Mammogram  Never done    TETANUS VACCINE  01/01/2006    Hemoglobin A1c (Diabetic Prevention Screening)  Never done    Colorectal Cancer Screening  Never done    Lipid Panel  10/24/2022    Shingles Vaccine (1 of 2) 04/15/2026 (Originally 5/9/2023)    COVID-19 Vaccine (1 - 2024-25 season) 04/15/2026 (Originally 9/1/2024)    Pneumococcal Vaccines (Age 50+) (1 of 1 - PCV) 04/15/2026 (Originally 5/9/2023)    RSV Vaccine (Age 60+ and Pregnant patients) (1 - 1-dose 75+ series) 05/09/2048    Influenza Vaccine  Discontinued     Health Maintenance Topics with due status: Not Due       Topic Last Completion Date    RSV Vaccine (Age 60+ and Pregnant patients) Not Due     Health Maintenance Due   Topic Date Due    Hepatitis C Screening  Never done    HIV Screening  Never done    Mammogram  Never done    TETANUS VACCINE  01/01/2006    Hemoglobin A1c (Diabetic Prevention Screening)  Never done    Colorectal Cancer Screening  Never done    Lipid Panel  10/24/2022  "      PHYSICAL EXAM:  Vitals:    04/15/25 1505 04/15/25 1510   BP: (!) 166/97 (!) 184/109   Pulse: 98    Resp: 18    Temp: 98.1 °F (36.7 °C)    TempSrc: Oral    SpO2: 97%    Weight: 78.6 kg (173 lb 3.2 oz)    Height: 5' 2" (1.575 m)      Body mass index is 31.68 kg/m².  Physical Exam     ASSESSMENT/PLAN:  1. Encounter to establish care    2. Anxiety  -     Ambulatory referral/consult to Psychiatry; Future; Expected date: 04/15/2025  -     ALPRAZolam (XANAX) 0.5 MG tablet; Take 1 tablet (0.5 mg total) by mouth 2 (two) times daily as needed for Anxiety or Insomnia.  Dispense: 60 tablet; Refill: 5    3. Moderate single current episode of major depressive disorder  -     Ambulatory referral/consult to Psychiatry; Future; Expected date: 04/15/2025    4. Encounter for screening mammogram for malignant neoplasm of breast  -     Mammo Digital Screening Bilat w/ [place] (XPD); Future; Expected date: 04/15/2025    5. Screening for diabetes mellitus (DM)  -     Comprehensive Metabolic Panel; Future; Expected date: 04/15/2025  -     Hemoglobin A1C; Future; Expected date: 04/15/2025  -     Microalbumin/Creatinine Ratio, Urine; Future; Expected date: 04/15/2025    6. Screening for iron deficiency anemia  -     CBC Auto Differential; Future; Expected date: 04/15/2025    7. Thyroid disorder screening  -     TSH; Future; Expected date: 04/15/2025    8. Need for lipid screening  -     Lipid Panel; Future; Expected date: 04/15/2025    9. Encounter for hepatitis C screening test for low risk patient  -     Hepatitis C Antibody; Future; Expected date: 04/15/2025    10. Encounter for screening for HIV  -     HIV 1/2 Ag/Ab (4th Gen); Future; Expected date: 04/15/2025    11. Encounter for vitamin deficiency screening  -     Vitamin D; Future; Expected date: 04/15/2025    12. Night sweats  -     Ambulatory referral/consult to Obstetrics / Gynecology; Future; Expected date: 04/15/2025  -     estradioL (VIVELLE-DOT) 0.1 mg/24 hr PTSW; Place 1 " patch onto the skin twice a week.  Dispense: 8 patch; Refill: 11    13. Hot flashes  -     Ambulatory referral/consult to Obstetrics / Gynecology; Future; Expected date: 04/15/2025  -     estradioL (VIVELLE-DOT) 0.1 mg/24 hr PTSW; Place 1 patch onto the skin twice a week.  Dispense: 8 patch; Refill: 11        Other than changes above, continue current medications and maintain follow up with specialists.      No follow-ups on file.   No results found for this or any previous visit (from the past 12 weeks).      ADAM Grififth  [facility]                       [1]   Current Outpatient Medications:     ALPRAZolam (XANAX) 0.5 MG tablet, Take 1 tablet (0.5 mg total) by mouth 2 (two) times daily as needed for Anxiety or Insomnia., Disp: 60 tablet, Rfl: 5    [START ON 4/17/2025] estradioL (VIVELLE-DOT) 0.1 mg/24 hr PTSW, Place 1 patch onto the skin twice a week., Disp: 8 patch, Rfl: 11    mometasone (NASONEX) 50 mcg/actuation nasal spray, , Disp: , Rfl:

## 2025-04-16 ENCOUNTER — PATIENT OUTREACH (OUTPATIENT)
Dept: ADMINISTRATIVE | Facility: HOSPITAL | Age: 50
End: 2025-04-16
Payer: COMMERCIAL

## 2025-04-16 ENCOUNTER — RESULTS FOLLOW-UP (OUTPATIENT)
Dept: PRIMARY CARE CLINIC | Facility: CLINIC | Age: 50
End: 2025-04-16

## 2025-04-16 DIAGNOSIS — E87.6 HYPOKALEMIA: ICD-10-CM

## 2025-04-16 DIAGNOSIS — E78.2 MIXED HYPERLIPIDEMIA: Primary | ICD-10-CM

## 2025-04-16 LAB — HIV 1+2 AB+HIV1 P24 AG SERPL QL IA: NORMAL

## 2025-04-16 RX ORDER — POTASSIUM CHLORIDE 20 MEQ/1
20 TABLET, EXTENDED RELEASE ORAL 2 TIMES DAILY
Qty: 60 TABLET | Refills: 0 | Status: SHIPPED | OUTPATIENT
Start: 2025-04-16

## 2025-04-16 NOTE — LETTER
AUTHORIZATION FOR RELEASE OF   CONFIDENTIAL INFORMATION        We are seeing Noris Washburn, date of birth 1975, in the clinic at [facility]. Luz Aviles NP is the patient's PCP. Noris Washburn has an outstanding lab/procedure at the time we reviewed her chart. In order to help keep her health information updated, she has authorized us to request the following medical record(s):        (  )  MAMMOGRAM                                      ( x )  COLONOSCOPY      (  )  PAP SMEAR                                          (  )  OUTSIDE LAB RESULTS     (  )  DEXA SCAN                                          (  )  EYE EXAM            (  )  FOOT EXAM                                          (  )  ENTIRE RECORD     (  )  OUTSIDE IMMUNIZATIONS                 (  )  _______________         Please fax records to Luz Aviles NP, [phone]      If you have any questions, please contact Antonette at [phone].           Patient Name: Noris Washburn  : 1975  Patient Phone #: [phone]

## 2025-04-16 NOTE — PROGRESS NOTES
Portal active: yes  Chart reviewed, immunization record updated.  No new results noted on [facility] or [facility] web site.  Care Everywhere updated.   Smoking Cessation Program Eligibility: former smoker  Patient care coordination note  LOV with PCP 4/15/24  Froylan sent to Dr. Byers for colonoscopy.

## 2025-04-28 ENCOUNTER — OFFICE VISIT (OUTPATIENT)
Dept: OBSTETRICS AND GYNECOLOGY | Facility: CLINIC | Age: 50
End: 2025-04-28
Payer: COMMERCIAL

## 2025-04-28 VITALS
BODY MASS INDEX: 31.65 KG/M2 | HEART RATE: 86 BPM | HEIGHT: 62 IN | WEIGHT: 172 LBS | SYSTOLIC BLOOD PRESSURE: 128 MMHG | DIASTOLIC BLOOD PRESSURE: 60 MMHG

## 2025-04-28 DIAGNOSIS — N89.8 VAGINAL DRYNESS: Primary | ICD-10-CM

## 2025-04-28 DIAGNOSIS — R68.82 LIBIDO, DECREASED: ICD-10-CM

## 2025-04-28 DIAGNOSIS — R61 NIGHT SWEATS: ICD-10-CM

## 2025-04-28 DIAGNOSIS — N95.2 VAGINAL ATROPHY: ICD-10-CM

## 2025-04-28 DIAGNOSIS — R23.2 HOT FLASHES: ICD-10-CM

## 2025-04-28 PROCEDURE — 99203 OFFICE O/P NEW LOW 30 MIN: CPT | Mod: S$GLB,,, | Performed by: OBSTETRICS & GYNECOLOGY

## 2025-04-28 PROCEDURE — 1159F MED LIST DOCD IN RCRD: CPT | Mod: CPTII,S$GLB,, | Performed by: OBSTETRICS & GYNECOLOGY

## 2025-04-28 PROCEDURE — 1160F RVW MEDS BY RX/DR IN RCRD: CPT | Mod: CPTII,S$GLB,, | Performed by: OBSTETRICS & GYNECOLOGY

## 2025-04-28 PROCEDURE — 3074F SYST BP LT 130 MM HG: CPT | Mod: CPTII,S$GLB,, | Performed by: OBSTETRICS & GYNECOLOGY

## 2025-04-28 PROCEDURE — 3078F DIAST BP <80 MM HG: CPT | Mod: CPTII,S$GLB,, | Performed by: OBSTETRICS & GYNECOLOGY

## 2025-04-28 PROCEDURE — 3044F HG A1C LEVEL LT 7.0%: CPT | Mod: CPTII,S$GLB,, | Performed by: OBSTETRICS & GYNECOLOGY

## 2025-04-28 PROCEDURE — 3008F BODY MASS INDEX DOCD: CPT | Mod: CPTII,S$GLB,, | Performed by: OBSTETRICS & GYNECOLOGY

## 2025-04-28 PROCEDURE — 99999 PR PBB SHADOW E&M-EST. PATIENT-LVL III: CPT | Mod: PBBFAC,,, | Performed by: OBSTETRICS & GYNECOLOGY

## 2025-04-28 RX ORDER — ESTRADIOL 0.1 MG/G
1 CREAM VAGINAL
Qty: 42.5 G | Refills: 1 | Status: SHIPPED | OUTPATIENT
Start: 2025-04-28 | End: 2026-04-28

## 2025-04-28 NOTE — PROGRESS NOTES
Subjective:    Patient ID: Noris Washburn is a 49 y.o. y.o. female    Chief Complaint:   Chief Complaint   Patient presents with    Hot Flashes     Night sweats- hysterectomy and later had ovaries removed. Currently on HRT patches but not sure it is helping.        History of Present Illness:  Noris Arias presents today for discussion of hot flashes and night sweats.  Patient has history of hysterectomy several years ago and later had her ovaries removed.  She is currently on estradiol patches but is not sure if it is helping.  Her estradiol patches were started by primary care.  She complains of other symptoms such as decreased libido and vaginal dryness.  Symptoms have been present for years.    Has been on vivelle-dot 0.1 mg patch since 4/17. States not helping    In discussion with patient I explained that it may take a little longer than a week for the medication to take effect.  We usually allow 1-2 months to see how hormone replacement effects our symptoms.  She is concerned as she does not sleep in the same room with her has been anymore because he snores and the sound of the snoring bothers her a lot now.      Review of Systems   Constitutional:  Negative for chills and fever.   Respiratory:  Negative for shortness of breath.    Cardiovascular:  Negative for chest pain.   Gastrointestinal:  Negative for constipation.   Genitourinary:  Positive for decreased libido, hot flashes and vaginal dryness. Negative for vaginal discharge.   Neurological:  Negative for headaches.         Objective:    Vital Signs:  Vitals:    04/28/25 1416   BP: 128/60   Pulse: 86     Wt Readings from Last 1 Encounters:   04/28/25 78 kg (172 lb)     Body mass index is 31.46 kg/m².    Physical Exam:  General:  alert, no distress    Discussion only     Discussed her symptoms as well as expectations for medications.  I explained that it does take time for hormones to build up in her system in order to see a difference when  it comes to a hot flashes and night sweats.  I will add vaginal estrogen cream to help with the dryness and atrophy symptoms.  As far as her decreased libido is concerned, recommend making intercourse a priority 1 day per week which will hopefully lead into more frequent intercourse.  Addressing her 's snoring would be wise as it may be affecting his overall health as well as marital health.    I spent a total of 30 minutes on the day of the visit.  This includes face to face time and non-face to face time preparing to see the patient (eg, review of tests), obtaining and/or reviewing separately obtained history, documenting clinical information in the electronic or other health record, independently interpreting results and communicating results to the patient/family/caregiver, or care coordinator.           Assessment:      1. Vaginal dryness    2. Night sweats    3. Hot flashes    4. Vaginal atrophy    5. Libido, decreased          Plan:      Vaginal dryness  -     estradioL (ESTRACE) 0.01 % (0.1 mg/gram) vaginal cream; Place 1 g vaginally twice a week.  Dispense: 42.5 g; Refill: 1    Night sweats  -     Ambulatory referral/consult to Obstetrics / Gynecology    Hot flashes  -     Ambulatory referral/consult to Obstetrics / Gynecology    Vaginal atrophy  -     estradioL (ESTRACE) 0.01 % (0.1 mg/gram) vaginal cream; Place 1 g vaginally twice a week.  Dispense: 42.5 g; Refill: 1    Libido, decreased    RTC 2 months follow up       Cely Chen MD, FACOG   04/28/2025 3:00 PM